# Patient Record
Sex: MALE
[De-identification: names, ages, dates, MRNs, and addresses within clinical notes are randomized per-mention and may not be internally consistent; named-entity substitution may affect disease eponyms.]

---

## 2022-08-08 PROBLEM — Z00.00 ENCOUNTER FOR PREVENTIVE HEALTH EXAMINATION: Status: ACTIVE | Noted: 2022-08-08

## 2022-08-09 ENCOUNTER — APPOINTMENT (OUTPATIENT)
Dept: ORTHOPEDIC SURGERY | Facility: CLINIC | Age: 52
End: 2022-08-09

## 2022-08-09 VITALS — HEIGHT: 71 IN | BODY MASS INDEX: 24.92 KG/M2 | WEIGHT: 178 LBS

## 2022-08-09 DIAGNOSIS — M75.42 IMPINGEMENT SYNDROME OF LEFT SHOULDER: ICD-10-CM

## 2022-08-09 DIAGNOSIS — M79.18 MYALGIA, OTHER SITE: ICD-10-CM

## 2022-08-09 PROCEDURE — J3490M: CUSTOM

## 2022-08-09 PROCEDURE — 73010 X-RAY EXAM OF SHOULDER BLADE: CPT | Mod: LT

## 2022-08-09 PROCEDURE — 73030 X-RAY EXAM OF SHOULDER: CPT | Mod: LT

## 2022-08-09 PROCEDURE — 20611 DRAIN/INJ JOINT/BURSA W/US: CPT

## 2022-08-09 PROCEDURE — 99214 OFFICE O/P EST MOD 30 MIN: CPT | Mod: 25

## 2022-08-09 NOTE — HISTORY OF PRESENT ILLNESS
[de-identified] : 52 year old male  (RHD, , runner )  left shoulder pain since January 2022. Started lifting heavier when he gradually began to experience pain. \par The pain is located ant, lateral and deep\par The pain is associated with sense of weakness\par Worse with activity and better at rest.\par Has tried modified activity\par

## 2022-08-23 NOTE — ASSESSMENT
[FreeTextEntry1] : Left X-Ray Examination of the SHOULDER (2 views):  No fractures, subluxations or dislocations. \par X-Ray Examination of the SCAPULA 1 or 2 views shows: No significant abnormalities.  Acromial spur. \par \par - We discussed their diagnosis and treatment options at length. \par - We will continue conservative treatment with a course of PT, icing, and anti-inflammatory medication.\par - The patient was provided with a prescription to work on scapular strengthening and rotator cuff strengthening.\par - The patient was advised to let pain guide the gradual advancement of activities. \par - We also discussed the possible of a corticosteroid injection in order to help decrease inflammation and pain so that they can perform better therapy.\par - The risks, benefits, and alternatives to corticosteroid injection were reviewed with the patient and they wished to proceed with this treatment course. \par - Follow up as needed in 6 weeks to re-evaluate progress with therapy\par  Zygomaticofacial Flap Text: Given the location of the defect, shape of the defect and the proximity to free margins a zygomaticofacial flap was deemed most appropriate for repair.  Using a sterile surgical marker, the appropriate flap was drawn incorporating the defect and placing the expected incisions within the relaxed skin tension lines where possible. The area thus outlined was incised deep to adipose tissue with a #15 scalpel blade with preservation of a vascular pedicle.  The skin margins were undermined to an appropriate distance in all directions utilizing iris scissors.  The flap was then placed into the defect and anchored with interrupted buried subcutaneous sutures.

## 2022-09-20 ENCOUNTER — APPOINTMENT (OUTPATIENT)
Dept: ORTHOPEDIC SURGERY | Facility: CLINIC | Age: 52
End: 2022-09-20

## 2023-08-04 NOTE — IMAGING
Approved.   [de-identified] : \par LEFT SHOULDER\par Inspection: No swelling. \par Palpation: Tenderness is noted at the bicipital groove, anterior and lateral. \par Range of motion: There is pain with range of motion.\par , ER 55, @90ER 90, @90IR 30\par Strength: There is pain and discomfort with strength testing.\par Forward Flexion 4/5. Abduction 4/5.  External Rotation 5-/5 and Internal Rotation 5/5 \par Neurological testings: motor and sensor intact distally.\par Ligament Stability and Special Tests: \par There is positive arc of pain. \par Shoulder apprehension: neg\par Shoulder relocation: neg\par Shearer’s test: pos\par Biceps Active test: neg\par Aquino Labral Shear: neg\par Impingement testing: pos\par Nancy testing: pos\par Whipple: pos\par Cross Body Adduction: neg\par \par

## 2024-08-11 ENCOUNTER — EMERGENCY (EMERGENCY)
Facility: HOSPITAL | Age: 54
LOS: 1 days | Discharge: HOME | End: 2024-08-11
Attending: INTERNAL MEDICINE | Admitting: INTERNAL MEDICINE
Payer: COMMERCIAL

## 2024-08-11 VITALS
HEIGHT: 71 IN | SYSTOLIC BLOOD PRESSURE: 132 MMHG | OXYGEN SATURATION: 98 % | DIASTOLIC BLOOD PRESSURE: 83 MMHG | RESPIRATION RATE: 18 BRPM | TEMPERATURE: 98 F | HEART RATE: 57 BPM | WEIGHT: 182.1 LBS

## 2024-08-11 PROCEDURE — 73130 X-RAY EXAM OF HAND: CPT

## 2024-08-11 PROCEDURE — 99284 EMERGENCY DEPT VISIT MOD MDM: CPT

## 2024-08-11 PROCEDURE — 99283 EMERGENCY DEPT VISIT LOW MDM: CPT

## 2024-08-11 PROCEDURE — 73130 X-RAY EXAM OF HAND: CPT | Mod: 26,50

## 2024-08-11 RX ORDER — IBUPROFEN 200 MG
600 TABLET ORAL ONCE
Refills: 0 | Status: COMPLETED | OUTPATIENT
Start: 2024-08-11 | End: 2024-08-11

## 2024-08-11 RX ORDER — IBUPROFEN 200 MG
1 TABLET ORAL
Qty: 40 | Refills: 0
Start: 2024-08-11 | End: 2024-08-20

## 2024-08-11 RX ORDER — CEPHALEXIN 250 MG
500 CAPSULE ORAL ONCE
Refills: 0 | Status: COMPLETED | OUTPATIENT
Start: 2024-08-11 | End: 2024-08-11

## 2024-08-11 RX ORDER — CEPHALEXIN 250 MG
1 CAPSULE ORAL
Qty: 21 | Refills: 0
Start: 2024-08-11 | End: 2024-08-17

## 2024-08-11 RX ADMIN — Medication 500 MILLIGRAM(S): at 13:08

## 2024-08-11 RX ADMIN — Medication 600 MILLIGRAM(S): at 13:08

## 2024-08-11 RX ADMIN — Medication 600 MILLIGRAM(S): at 13:38

## 2024-08-11 NOTE — ED ADULT NURSE NOTE - CHIEF COMPLAINT
[facility] Emergency Department    [address] [place] [address]    Phone:  [phone]    Fax:  [phone]                                       Stacy Brown   MRN: [id number]    Department:  [facility] Emergency Department   Date of Visit:  12/22/2017           After Visit Summary Signature Page     I have received my discharge instructions, and my questions have been answered. I have discussed any challenges I see with this plan with the nurse or doctor.    ..........................................................................................................................................  Patient/Patient Representative Signature      ..........................................................................................................................................  Patient Representative Print Name and Relationship to Patient    ..................................................               ................................................  Date                                            Time    ..........................................................................................................................................  Reviewed by Signature/Title    ...................................................              ..............................................  Date                                                            Time           The patient is a 54y Male complaining of finger pain/injury.

## 2024-08-11 NOTE — ED ADULT NURSE NOTE - OBJECTIVE STATEMENT
PAtient BIBA from home c/o getting left/right 3rd fingers abrasions while moving a treadmill. Alert and oriented x 4. No signs or symptoms of nausea, vomiting, dizziness or SOB.

## 2024-08-11 NOTE — ED PROVIDER NOTE - CARE PROVIDER_API CALL
Sherrie Contreras  Plastic Surgery  [address] [place] [address], [address]  [place] [address]  Phone: [phone]  Fax: [phone]  Follow Up Time:

## 2024-08-11 NOTE — ED PROVIDER NOTE - NSFOLLOWUPINSTRUCTIONS_ED_ALL_ED_FT
Follow up with your PMD within 1-2 days.  Rest, increase your fluids, advance your activity as tolerated.   Take all of your other medications as previously prescribed.   Worsening, continued or ANY new concerning symptoms return to the emergency department.  follow up hand specialist Finger splint, keep the wound clean and dry Keflex 3 times a day Motrin for pain

## 2024-08-11 NOTE — ED PROVIDER NOTE - OBJECTIVE STATEMENT
54-year-old male came to the emergency room with chief complaint of injuries to both middle finger and the right fourth finger crushed between the treadmill also abrasions in the both middle fingers on the dorsum of the left middle finger was more swollen and tender

## 2024-08-11 NOTE — ED PROVIDER NOTE - PATIENT PORTAL LINK FT
You can access the FollowMyHealth Patient Portal offered by [facility] by registering at the following website: [email or web address]. By joining [facility] FollowMyHealth portal, you will also be able to view your health information using other applications (apps) compatible with our system.

## 2024-08-11 NOTE — ED PROVIDER NOTE - CLINICAL SUMMARY MEDICAL DECISION MAKING FREE TEXT BOX
54-year-old male came to the emergency room with chief complaint of injuries to both middle finger and the right fourth finger crushed between the treadmill also abrasions in the both middle fingers on the dorsum of the left middle finger was more swollen and tender  The fingers were washed and Dermabond applied both middle fingers finger splint was placed

## 2024-08-11 NOTE — ED PROVIDER NOTE - PHYSICAL EXAMINATION
General:     NAD, well-nourished, well-appearing  Head:     NC/AT, EOMI, oral mucosa moist  Neck:     trachea midline  Lungs:     CTA b/l, no w/r/r  CVS:     S1S2, RRR, no m/g/r  Abd:     +BS, s/nt/nd, no organomegaly  Ext:    2+ radial and pedal pulses, no c/c/e  Neuro: AAOx3, no sensory/motor deficits  Ortho–left third finger swollen tender positive abrasions left fourth finger mild ecchymosis right third finger tender swollen positive abrasions cap refill on both hands less than 2 seconds sensorimotor intact

## 2024-08-11 NOTE — ED PROVIDER NOTE - CARE PLAN
1 Principal Discharge DX:	Fracture of finger, distal phalanx  Secondary Diagnosis:	Abrasion of right hand and fingers  Secondary Diagnosis:	Abrasion of finger of left hand

## 2024-08-27 ENCOUNTER — APPOINTMENT (OUTPATIENT)
Dept: ORTHOPEDIC SURGERY | Facility: CLINIC | Age: 54
End: 2024-08-27

## 2024-09-03 ENCOUNTER — NON-APPOINTMENT (OUTPATIENT)
Age: 54
End: 2024-09-03

## 2024-09-03 ENCOUNTER — APPOINTMENT (OUTPATIENT)
Dept: ORTHOPEDIC SURGERY | Facility: CLINIC | Age: 54
End: 2024-09-03
Payer: COMMERCIAL

## 2024-09-03 VITALS — BODY MASS INDEX: 25.2 KG/M2 | HEIGHT: 71 IN | WEIGHT: 180 LBS

## 2024-09-03 DIAGNOSIS — S69.91XA UNSPECIFIED INJURY OF RIGHT WRIST, HAND AND FINGER(S), INITIAL ENCOUNTER: ICD-10-CM

## 2024-09-03 PROCEDURE — 73140 X-RAY EXAM OF FINGER(S): CPT

## 2024-09-03 PROCEDURE — 99203 OFFICE O/P NEW LOW 30 MIN: CPT

## 2024-09-03 NOTE — DISCUSSION/SUMMARY
[de-identified] : The underlying pathophysiology was reviewed with the patient. XR films were reviewed with the patient. Discussed at length the nature of the patients condition. Their right middle finger symptoms appear secondary to crush injury. The patient and I discussed his options regarding treatment.   Activity modifications were reviewed with the patient at length. I advised that the patient should continue with conservative management with wearing a splint. Patient was advised to take OTC medications and topical analgesic for pain management.  Gentle range of motion and strengthening exercises were encouraged, as tolerated by pain.   All questions answered, understanding verbalized. Patient in agreement with plan of care.  The patient should follow-up with me in 6 weeks or as needed.

## 2024-09-03 NOTE — HISTORY OF PRESENT ILLNESS
[de-identified] : 54 year old RHD gentleman presents for initial evaluation of a right long finger injury sustained on 8/11/24 after a treadmill landed on bilateral hands. He was seen at [facility] where x-rays were performed. He presents today in a splint. He notes pain between the DIP and PIP joint. He also has a decrease in sensation at the tip of his right long finger. He takes Advil for the pain with moderate relief.

## 2024-09-03 NOTE — DISCUSSION/SUMMARY
[de-identified] : The underlying pathophysiology was reviewed with the patient. XR films were reviewed with the patient. Discussed at length the nature of the patients condition. Their right middle finger symptoms appear secondary to crush injury. The patient and I discussed his options regarding treatment.   Activity modifications were reviewed with the patient at length. I advised that the patient should continue with conservative management with wearing a splint. Patient was advised to take OTC medications and topical analgesic for pain management.  Gentle range of motion and strengthening exercises were encouraged, as tolerated by pain.   All questions answered, understanding verbalized. Patient in agreement with plan of care.  The patient should follow-up with me in 6 weeks or as needed.

## 2024-09-03 NOTE — PHYSICAL EXAM
[de-identified] : Patient is WDWN, alert, and in no acute distress. Breathing is unlabored. He is grossly oriented to person, place, and time.   Left hand: Inspection/Palpation: There is tenderness to palpation over the 3rd finger distal phalanx.   Range of Motion: Full extension and flexion. Sensation is intact and normal.    [de-identified] : AP, lateral and oblique views of the right hand were obtained today and revealed normal alignment.   ACC: [id number] EXAM: XR HAND MIN 3 VIEWS BI ORDERED BY: KERLINE SANCHEZ  PROCEDURE DATE: 08/11/2024    INTERPRETATION: Clinical history injury  3 views of each hand are provided.  The right hand demonstrates no evidence of acute fracture or subluxation.  The left hand demonstrates no evidence of acute fracture or subluxation.  IMPRESSION:  No evidence of acute injury.  --- End of Report ---

## 2024-09-03 NOTE — HISTORY OF PRESENT ILLNESS
[de-identified] : 54 year old RHD gentleman presents for initial evaluation of a right long finger injury sustained on 8/11/24 after a treadmill landed on bilateral hands. He was seen at [facility] where x-rays were performed. He presents today in a splint. He notes pain between the DIP and PIP joint. He also has a decrease in sensation at the tip of his right long finger. He takes Advil for the pain with moderate relief.

## 2024-09-03 NOTE — PHYSICAL EXAM
[de-identified] : Patient is WDWN, alert, and in no acute distress. Breathing is unlabored. He is grossly oriented to person, place, and time.   Left hand: Inspection/Palpation: There is tenderness to palpation over the 3rd finger distal phalanx.   Range of Motion: Full extension and flexion. Sensation is intact and normal.    [de-identified] : AP, lateral and oblique views of the right hand were obtained today and revealed normal alignment.   ACC: [id number] EXAM: XR HAND MIN 3 VIEWS BI ORDERED BY: KERLINE SANCHEZ  PROCEDURE DATE: 08/11/2024    INTERPRETATION: Clinical history injury  3 views of each hand are provided.  The right hand demonstrates no evidence of acute fracture or subluxation.  The left hand demonstrates no evidence of acute fracture or subluxation.  IMPRESSION:  No evidence of acute injury.  --- End of Report ---

## 2024-09-03 NOTE — ADDENDUM
[FreeTextEntry1] : I, Torsten Kim Jr, acted solely as a scribe for Dr. Abdoulaye Guillen on this date 09/03/2024  All medical record entries made by the Scribe were at my, Dr. Abdoulaye Guillen, direction and personally dictated by me on 09/03/2024 . I have reviewed the chart and agree that the record accurately reflects my personal performance of the history, physical exam, assessment and plan. I have also personally directed, reviewed, and agreed with the chart.